# Patient Record
Sex: FEMALE | Race: WHITE | NOT HISPANIC OR LATINO | Employment: UNEMPLOYED | URBAN - METROPOLITAN AREA
[De-identification: names, ages, dates, MRNs, and addresses within clinical notes are randomized per-mention and may not be internally consistent; named-entity substitution may affect disease eponyms.]

---

## 2017-08-21 ENCOUNTER — GENERIC CONVERSION - ENCOUNTER (OUTPATIENT)
Dept: OTHER | Facility: OTHER | Age: 52
End: 2017-08-21

## 2018-01-10 NOTE — RESULT NOTES
Message   Tell patient that blood work was normal     Verified Results  (1) HEPATIC FUNCTION PANEL [id number] 01:57PM Sera Barreto Order Number: [id number]   Order Number: [id number]     Test Name Result Flag Reference   ALBUMIN 3 6 g/dL  3 5-5 0   ALK PHOSPHATAS 116 U/L     ALT (SGPT) 21 U/L  12-78   AST(SGOT) 13 U/L  5-45   BILI, DIRECT 0 06 mg/dL  0 00-0 20   BILI, TOTAL 0 18 mg/dL L 0 20-1 00   TOTAL PROTEIN 6 8 g/dL  6 4-8 2

## 2018-01-12 NOTE — RESULT NOTES
Message   tell patient blood work is normal     Verified Results  (1) CBC/PLT/DIFF [id number] 01:57PM Mattie Gan Order Number: [id number]   Order Number: [id number]     Test Name Result Flag Reference   WBC COUNT 8 46 Thousand/uL  4 31-10 16   RBC COUNT 4 35 Million/uL  3 81-5 12   HEMOGLOBIN 14 2 g/dL  11 5-15 4   HEMATOCRIT 42 8 %  34 8-46  1   MCV 98 fL  82-98   MCH 32 6 pg  26 8-34 3   MCHC 33 2 g/dL  31 4-37 4   RDW 12 8 %  11 6-15 1   MPV 11 2 fL  8 9-12 7   PLATELET COUNT 263 Thousands/uL  149-390   nRBC AUTOMATED 0 /100 WBCs     NEUTROPHILS RELATIVE PERCENT 62 %  43-75   LYMPHOCYTES RELATIVE PERCENT 32 %  14-44   MONOCYTES RELATIVE PERCENT 3 % L 4-12   EOSINOPHILS RELATIVE PERCENT 2 %  0-6   BASOPHILS RELATIVE PERCENT 1 %  0-1   NEUTROPHILS ABSOLUTE COUNT 5 21 Thousands/?L  1 85-7 62   LYMPHOCYTES ABSOLUTE COUNT 2 67 Thousands/?L  0 60-4 47   MONOCYTES ABSOLUTE COUNT 0 26 Thousand/?L  0 17-1 22   EOSINOPHILS ABSOLUTE COUNT 0 17 Thousand/?L  0 00-0 61   BASOPHILS ABSOLUTE COUNT 0 07 Thousands/?L  0 00-0 10

## 2018-06-11 ENCOUNTER — OFFICE VISIT (OUTPATIENT)
Dept: DERMATOLOGY | Facility: CLINIC | Age: 53
End: 2018-06-11
Payer: COMMERCIAL

## 2018-06-11 DIAGNOSIS — L40.9 PSORIASIS OF SCALP: Primary | ICD-10-CM

## 2018-06-11 DIAGNOSIS — Z13.89 SCREENING FOR SKIN CONDITION: ICD-10-CM

## 2018-06-11 DIAGNOSIS — L24.9 IRRITANT DERMATITIS: ICD-10-CM

## 2018-06-11 PROCEDURE — 99213 OFFICE O/P EST LOW 20 MIN: CPT | Performed by: DERMATOLOGY

## 2018-06-11 RX ORDER — FLUTICASONE PROPIONATE 50 MCG
2 SPRAY, SUSPENSION (ML) NASAL
COMMUNITY
Start: 2018-03-30

## 2018-06-11 RX ORDER — ATORVASTATIN CALCIUM 10 MG/1
TABLET, FILM COATED ORAL
COMMUNITY

## 2018-06-11 RX ORDER — DULOXETIN HYDROCHLORIDE 60 MG/1
60 CAPSULE, DELAYED RELEASE ORAL
COMMUNITY
Start: 2018-03-30

## 2018-06-11 RX ORDER — IBUPROFEN 200 MG
200 TABLET ORAL
COMMUNITY

## 2018-06-11 RX ORDER — METRONIDAZOLE 7.5 MG/G
GEL TOPICAL
COMMUNITY

## 2018-06-11 RX ORDER — KETOCONAZOLE 20 MG/G
CREAM TOPICAL 2 TIMES DAILY
Qty: 30 G | Refills: 4 | Status: SHIPPED | OUTPATIENT
Start: 2018-06-11

## 2018-06-11 RX ORDER — KETOCONAZOLE 20 MG/ML
1 SHAMPOO TOPICAL 2 TIMES WEEKLY
Qty: 120 ML | Refills: 4 | Status: SHIPPED | OUTPATIENT
Start: 2018-06-11

## 2018-06-11 RX ORDER — CARVEDILOL 12.5 MG/1
TABLET ORAL
Refills: 3 | COMMUNITY
Start: 2018-03-29

## 2018-06-11 RX ORDER — MONTELUKAST SODIUM 10 MG/1
10 TABLET ORAL
COMMUNITY

## 2018-06-11 RX ORDER — BACLOFEN 10 MG/1
TABLET ORAL
Refills: 0 | COMMUNITY
Start: 2018-03-29

## 2018-06-11 RX ORDER — ALBUTEROL SULFATE 90 UG/1
2 AEROSOL, METERED RESPIRATORY (INHALATION)
COMMUNITY
Start: 2016-12-15

## 2018-06-11 RX ORDER — LISINOPRIL AND HYDROCHLOROTHIAZIDE 12.5; 1 MG/1; MG/1
1 TABLET ORAL
COMMUNITY

## 2018-06-11 RX ORDER — POTASSIUM CHLORIDE 750 MG/1
TABLET, EXTENDED RELEASE ORAL
Refills: 0 | COMMUNITY
Start: 2018-03-29

## 2018-06-11 RX ORDER — OMEPRAZOLE 20 MG/1
CAPSULE, DELAYED RELEASE ORAL
Refills: 3 | COMMUNITY
Start: 2018-04-26

## 2018-06-11 RX ORDER — GABAPENTIN 400 MG/1
CAPSULE ORAL
Refills: 3 | COMMUNITY
Start: 2018-03-29

## 2018-06-11 RX ORDER — CYCLOSPORINE 0.5 MG/ML
EMULSION OPHTHALMIC
COMMUNITY
Start: 2016-12-08

## 2018-06-11 RX ORDER — ATENOLOL 50 MG/1
50 TABLET ORAL
COMMUNITY

## 2018-06-11 RX ORDER — CYCLOSPORINE 0.5 MG/ML
EMULSION OPHTHALMIC
Refills: 3 | COMMUNITY
Start: 2018-03-30

## 2018-06-11 RX ORDER — TRAMADOL HYDROCHLORIDE 50 MG/1
50 TABLET ORAL
COMMUNITY
Start: 2017-03-06

## 2018-06-11 RX ORDER — KETOCONAZOLE 20 MG/G
CREAM TOPICAL
COMMUNITY
Start: 2017-09-30 | End: 2018-06-11 | Stop reason: SDUPTHER

## 2018-06-11 RX ORDER — KETOCONAZOLE 20 MG/ML
SHAMPOO TOPICAL
COMMUNITY
Start: 2016-03-17 | End: 2018-06-11 | Stop reason: SDUPTHER

## 2018-06-11 RX ORDER — DULOXETIN HYDROCHLORIDE 60 MG/1
60 CAPSULE, DELAYED RELEASE ORAL
COMMUNITY

## 2018-06-11 RX ORDER — FUROSEMIDE 40 MG/1
TABLET ORAL
Refills: 11 | COMMUNITY
Start: 2018-05-30

## 2018-06-11 RX ORDER — LOSARTAN POTASSIUM 50 MG/1
50 TABLET ORAL
COMMUNITY

## 2018-06-11 RX ORDER — PRAMIPEXOLE DIHYDROCHLORIDE 0.5 MG/1
0.5 TABLET ORAL
COMMUNITY
Start: 2018-05-09

## 2018-06-11 RX ORDER — TRIAMCINOLONE ACETONIDE 1 MG/G
CREAM TOPICAL
COMMUNITY
Start: 2016-10-17

## 2018-06-11 RX ORDER — METHOCARBAMOL 750 MG/1
750 TABLET, FILM COATED ORAL
COMMUNITY

## 2018-06-11 RX ORDER — ACETAMINOPHEN 500 MG
500 TABLET ORAL
COMMUNITY

## 2018-06-11 RX ORDER — AMITRIPTYLINE HYDROCHLORIDE 100 MG/1
TABLET, FILM COATED ORAL
COMMUNITY
Start: 2018-03-01

## 2018-06-11 RX ORDER — CLONAZEPAM 1 MG/1
TABLET ORAL
Refills: 1 | COMMUNITY
Start: 2018-05-30

## 2018-06-11 RX ORDER — BUSPIRONE HYDROCHLORIDE 10 MG/1
10 TABLET ORAL
COMMUNITY
Start: 2018-05-22

## 2018-06-11 RX ORDER — NYSTATIN 100000 U/G
OINTMENT TOPICAL
COMMUNITY

## 2018-06-11 RX ORDER — OXYCODONE HYDROCHLORIDE 5 MG/1
CAPSULE ORAL
COMMUNITY

## 2018-06-11 RX ORDER — DICLOFENAC SODIUM 75 MG/1
75 TABLET, DELAYED RELEASE ORAL
COMMUNITY

## 2018-06-11 RX ORDER — DILTIAZEM HYDROCHLORIDE 120 MG/1
120 TABLET, FILM COATED ORAL
COMMUNITY

## 2018-06-11 NOTE — PATIENT INSTRUCTIONS
psoriasis of the scalp under good control overall refill prescription   irritant dermatitis of the hands probably related to over washing suggested use of moisturizes mild soaps avoidance of irritants and see if this will get this under control  Screening for Dermatologic Disorders: Nothing else of concern noted on complete exam follow up in 1 year

## 2018-06-11 NOTE — PROGRESS NOTES
[address] [facility]  [address] [address]     MRN: [id number] : 1965  Encounter: [id number]  Patient Information: Quan Little  Chief complaint:Follow-up for psoriasis yearly checkup and complaints of dryness of her hand    History of present illness:  58-year-old female who had not seen for almost 18 months presents for overall checkup concerned regarding continued problems psoriasis on the scalp as well as dry itchy hands patient notes that she ran out of the topical medication which she was given at her last visit  No past medical history on file  No past surgical history on file  Social History   History   Alcohol use Not on file     History   Drug use: Unknown     History   Smoking Status    Current Every Day Smoker   Smokeless Tobacco    Never Used     No family history on file  Meds/Allergies   Allergies   Allergen Reactions    Doxepin Swelling    Other Wheezing    Quetiapine Swelling    Cephalexin     Clindamycin     Dicyclomine Hcl      RASH    Dust Mite Extract     Food      STRAWBERRIES    Mirtazapine Swelling     BODY SWELLING, HAND, FEET    Pb-Hyoscy-Atropine-Scopol Er     Penicillins     Prochlorperazine      SEIZURE-LIKE REACTION    Strawberry Extract     Sulfamethoxazole-Trimethoprim     Topiramate Hives    Valproic Acid Swelling    Dicyclomine Rash       Meds:  Prior to Admission medications    Medication Sig Start Date End Date Taking?  Authorizing Provider   albuterol (PROVENTIL HFA,VENTOLIN HFA) 90 mcg/act inhaler Inhale 2 puffs 12/15/16  Yes Historical Provider, MD   amitriptyline (ELAVIL) 100 mg tablet TAKE 1 TABLET BY MOUTH EVERY NIGHT AT BEDTIME(DISCONTINUE 50 MG DOSING) 3/1/18  Yes Historical Provider, MD   busPIRone (BUSPAR) 10 mg tablet Take 10 mg by mouth 18  Yes Historical Provider, MD   Ciclopirox (CICLOPIROX TREATMENT) 8 % KIT Apply topically 11/3/16  Yes Historical Provider, MD   cycloSPORINE (RESTASIS) 0 05 % ophthalmic emulsion Instill 1 drop into both eyes every 12 hours 12/8/16  Yes Historical Provider, MD   DULoxetine (CYMBALTA) 60 mg delayed release capsule Take 60 mg by mouth 3/30/18  Yes Historical Provider, MD   fluticasone (FLONASE) 50 mcg/act nasal spray 2 sprays into each nostril 3/30/18  Yes Historical Provider, MD   ketoconazole (NIZORAL) 2 % cream APPLY TO AFFECTED AREA SPARINGLY TWICE DAILY 9/30/17  Yes Historical Provider, MD   ketoconazole (NIZORAL) 2 % shampoo Apply topically 3/17/16  Yes Historical Provider, MD   Melatonin 1 MG CAPS Take 1 mg by mouth   Yes Historical Provider, MD   pramipexole (MIRAPEX) 0 5 mg tablet Take 0 5 mg by mouth 5/9/18  Yes Historical Provider, MD   traMADol (ULTRAM) 50 mg tablet Take 50 mg by mouth 3/6/17  Yes Historical Provider, MD   triamcinolone (KENALOG) 0 1 % cream Apply topically 10/17/16  Yes Historical Provider, MD   acetaminophen (TYLENOL) 500 mg tablet Take 500 mg by mouth    Historical Provider, MD   atenolol (TENORMIN) 50 mg tablet Take 50 mg by mouth    Historical Provider, MD   atorvastatin (LIPITOR) 10 mg tablet Take by mouth    Historical Provider, MD   baclofen 10 mg tablet TK 1 T PO BID 3/29/18   Historical Provider, MD   carvedilol (COREG) 12 5 mg tablet TK 1 T PO  BID WITH FOOD  3/29/18   Historical Provider, MD   cholecalciferol (VITAMIN D3) 1,000 units tablet Take by mouth    Historical Provider, MD   clonazePAM (KlonoPIN) 1 mg tablet TK 1 T PO  QID PRN P 5/30/18   Historical Provider, MD   diclofenac (VOLTAREN) 75 mg EC tablet Take 75 mg by mouth    Historical Provider, MD   diclofenac sodium (VOLTAREN) 50 mg EC tablet TK 1 T PO BID WF PRF PAIN 3/15/18   Historical Provider, MD   diltiazem (CARDIZEM) 120 MG tablet Take 120 mg by mouth    Historical Provider, MD   DULoxetine (CYMBALTA) 60 mg delayed release capsule Take 60 mg by mouth    Historical Provider, MD   furosemide (LASIX) 40 mg tablet TK 1 T PO D 5/30/18   Historical Provider, MD   gabapentin (NEURONTIN) 400 mg capsule TK 1 C PO TID 3/29/18   Historical Provider, MD   ibuprofen (MOTRIN) 200 mg tablet Take 200 mg by mouth    Historical Provider, MD   lisinopril-hydrochlorothiazide (PRINZIDE,ZESTORETIC) 10-12 5 MG per tablet Take 1 tablet by mouth    Historical Provider, MD   losartan (COZAAR) 50 mg tablet Take 50 mg by mouth    Historical Provider, MD   methocarbamol (ROBAXIN) 750 mg tablet Take 750 mg by mouth    Historical Provider, MD   metroNIDAZOLE (METROGEL) 0 75 % gel Apply topically    Historical Provider, MD   montelukast (SINGULAIR) 10 mg tablet Take 10 mg by mouth    Historical Provider, MD   mupirocin (BACTROBAN) 2 % ointment APPLY TOPICALLY TO THE AFFECTED AREA TID FOR 14 DAYS 4/26/18   Historical Provider, MD   nystatin (MYCOSTATIN) ointment Apply topically    Historical Provider, MD   omeprazole (PriLOSEC) 20 mg delayed release capsule TK ONE C PO  QD 4/26/18   Historical Provider, MD   oxyCODONE (OXY-IR) 5 MG capsule Take by mouth    Historical Provider, MD   potassium chloride (K-DUR,KLOR-CON) 10 mEq tablet TK 1 T PO QD 3/29/18   Historical Provider, MD   RESTASIS 0 05 % ophthalmic emulsion INSTILL 1 DROP INTO BOTH EYES Q 12 H 3/30/18   Historical Provider, MD       Subjective:     Review of Systems:    General: negative for - chills, fatigue, fever,  weight gain or weight loss  Psychological: negative for - anxiety, behavioral disorder, concentration difficulties, decreased libido, depression, irritability, memory difficulties, mood swings, sleep disturbances or suicidal ideation  ENT: negative for - hearing difficulties , nasal congestion, nasal discharge, oral lesions, sinus pain, sneezing, sore throat  Allergy and Immunology: negative for - hives, insect bite sensitivity,  Hematological and Lymphatic: negative for - bleeding problems, blood clots,bruising, swollen lymph nodes  Endocrine: negative for - hair pattern changes, hot flashes, malaise/lethargy, mood swings, palpitations, polydipsia/polyuria, skin changes, temperature intolerance or unexpected weight change  Respiratory: negative for - cough, hemoptysis, orthopnea, shortness of breath, or wheezing  Cardiovascular: negative for - chest pain, dyspnea on exertion, edema,  Gastrointestinal: negative for - abdominal pain, nausea/vomiting  Genito-Urinary: negative for - dysuria, incontinence, irregular/heavy menses or urinary frequency/urgency  Musculoskeletal: negative for - gait disturbance, joint pain, joint stiffness, joint swelling, muscle pain, muscular weakness  Dermatological:  As in HPI  Neurological: negative for confusion, dizziness, headaches, impaired coordination/balance, memory loss, numbness/tingling, seizures, speech problems, tremors or weakness       Objective: There were no vitals taken for this visit  Physical Exam:    General Appearance:    Alert, cooperative, no distress   Head:    Normocephalic, without obvious abnormality, atraumatic           Skin:   A full skin exam was performed including scalp, head scalp, eyes, ears, nose, lips, neck, chest, axilla, abdomen, back, buttocks, bilateral upper extremities, bilateral lower extremities, hands, feet, fingers, toes, fingernails, and toenails  Minimal scaling erythema on the scalp scaling erythema on the dorsum of the hands nothing else remarkable noted on exam     Assessment:     1  Psoriasis of scalp  ketoconazole (NIZORAL) 2 % shampoo    ketoconazole (NIZORAL) 2 % cream   2  Screening for skin condition     3   Irritant dermatitis           Plan:    psoriasis of the scalp under good control overall refill prescription   irritant dermatitis of the hands probably related to over washing suggested use of moisturizes mild soaps avoidance of irritants and see if this will get this under control  Screening for Dermatologic Disorders: Nothing else of concern noted on complete exam follow up in 1 year     Naomie Kebede MD  6/11/2018,3:29 PM    Portions of the record may have been created with voice recognition software   Occasional wrong word or "sound a like" substitutions may have occurred due to the inherent limitations of voice recognition software   Read the chart carefully and recognize, using context, where substitutions have occurred